# Patient Record
Sex: MALE
[De-identification: names, ages, dates, MRNs, and addresses within clinical notes are randomized per-mention and may not be internally consistent; named-entity substitution may affect disease eponyms.]

---

## 2021-04-12 ENCOUNTER — APPOINTMENT (OUTPATIENT)
Age: 46
End: 2021-04-12
Payer: COMMERCIAL

## 2021-04-12 PROCEDURE — 0001A: CPT

## 2021-05-03 ENCOUNTER — APPOINTMENT (OUTPATIENT)
Age: 46
End: 2021-05-03
Payer: COMMERCIAL

## 2021-05-03 PROCEDURE — 0002A: CPT

## 2022-04-20 ENCOUNTER — APPOINTMENT (OUTPATIENT)
Dept: ORTHOPEDIC SURGERY | Facility: CLINIC | Age: 47
End: 2022-04-20
Payer: COMMERCIAL

## 2022-04-20 VITALS — WEIGHT: 170 LBS | BODY MASS INDEX: 22.53 KG/M2 | HEIGHT: 73 IN

## 2022-04-20 DIAGNOSIS — Z87.891 PERSONAL HISTORY OF NICOTINE DEPENDENCE: ICD-10-CM

## 2022-04-20 DIAGNOSIS — Z78.9 OTHER SPECIFIED HEALTH STATUS: ICD-10-CM

## 2022-04-20 DIAGNOSIS — F12.90 CANNABIS USE, UNSPECIFIED, UNCOMPLICATED: ICD-10-CM

## 2022-04-20 PROBLEM — Z00.00 ENCOUNTER FOR PREVENTIVE HEALTH EXAMINATION: Status: ACTIVE | Noted: 2022-04-20

## 2022-04-20 PROCEDURE — 73562 X-RAY EXAM OF KNEE 3: CPT | Mod: RT

## 2022-04-20 PROCEDURE — 99203 OFFICE O/P NEW LOW 30 MIN: CPT

## 2022-04-20 NOTE — PHYSICAL EXAM
[Right] : right knee [NL (0)] : extension 0 degrees [5___] : hamstring 5[unfilled]/5 [AP] : anteroposterior [Lateral] : lateral [[place]] : skyline [] : non-antalgic [FreeTextEntry9] : mild djd of patellofemoral joint space, superior and inferior osteophytes/posterior patellar djd [TWNoteComboBox7] : flexion 125 degrees

## 2022-04-20 NOTE — HISTORY OF PRESENT ILLNESS
[7] : 7 [2] : 2 [Dull/Aching] : dull/aching [Sharp] : sharp [Intermittent] : intermittent [Rest] : rest [Stairs] : stairs [de-identified] : Patient presents today with right knee pain for 8 months with NKI. Patient is very active, running, biking, and skiing. States that he has pain after activities. Denies symptoms of instability, mechanical locking/clicking. Pain is lateral and under the kneecap. Denies previous treatments. Takes Advil PRN. Denies recent imaging. [] : no [FreeTextEntry1] : Right Knee [de-identified] : Running, Driving

## 2022-04-20 NOTE — ASSESSMENT
[FreeTextEntry1] : 47 yo male presenting with right knee IT band syndrome, mild patellofemoral oa. Denies mechanical symptoms\par -X-rays demonstrating posterior patellar djd with superior and inferior patellar osteophytes\par -Rx PT given today\par -Activities as tolerated\par -Rest, ice, NSAIDs PRN for pain\par -All questions answered\par -Fu 6 weeks

## 2022-06-01 ENCOUNTER — APPOINTMENT (OUTPATIENT)
Dept: ORTHOPEDIC SURGERY | Facility: CLINIC | Age: 47
End: 2022-06-01
Payer: COMMERCIAL

## 2022-06-01 ENCOUNTER — NON-APPOINTMENT (OUTPATIENT)
Age: 47
End: 2022-06-01

## 2022-06-01 DIAGNOSIS — M17.11 UNILATERAL PRIMARY OSTEOARTHRITIS, RIGHT KNEE: ICD-10-CM

## 2022-06-01 DIAGNOSIS — M76.31 ILIOTIBIAL BAND SYNDROME, RIGHT LEG: ICD-10-CM

## 2022-06-01 PROCEDURE — 99213 OFFICE O/P EST LOW 20 MIN: CPT

## 2022-06-01 NOTE — PHYSICAL EXAM
[Right] : right knee [NL (0)] : extension 0 degrees [5___] : hamstring 5[unfilled]/5 [AP] : anteroposterior [Lateral] : lateral [[place]] : skyline [] : non-antalgic [FreeTextEntry9] : mild djd of patellofemoral joint space, superior and inferior osteophytes/posterior patellar djd [TWNoteComboBox7] : flexion 125 degrees

## 2022-06-01 NOTE — REASON FOR VISIT
[FreeTextEntry2] : RT knee pain for 8 months with NKI. Patient is very active, running, biking, and skiing. States that he has pain after activities.

## 2022-06-01 NOTE — HISTORY OF PRESENT ILLNESS
[7] : 7 [2] : 2 [Dull/Aching] : dull/aching [Sharp] : sharp [Intermittent] : intermittent [Rest] : rest [Stairs] : stairs [de-identified] : 4/20/22 Patient presents today with right knee pain for 8 months with NKI. Patient is very active, running, biking, and skiing. States that he has pain after activities. Denies symptoms of instability, mechanical locking/clicking. Pain is lateral and under the kneecap. Denies previous treatments. Takes Advil PRN. Denies recent imaging.\par \par 6/1/22 feels 80% better. doing therapy, no meds for it. it clicks anterioly and is uncomfortable. [] : no [FreeTextEntry1] : Right Knee [de-identified] : Running, Driving

## 2022-06-01 NOTE — HISTORY OF PRESENT ILLNESS
[Result of repetitive motion] : result of repetitive motion [2] : 2 [Dull/Aching] : dull/aching [Sharp] : sharp [Intermittent] : intermittent [Rest] : rest [Stairs] : stairs [de-identified] : Pt is here following up on RT knee. He has been doing PT 2x a week and once a day at home. He does think it's helping, it is still making a clicking noise and now it only hurts when he runs a lot or lifts too heavy. Active is now 2/10 from 7/10 last visit. 80% better. No NSAIDs. Some clicking in knee cap with squatting motion. [] : no [FreeTextEntry1] : Right Knee [de-identified] : Running, Driving

## 2022-06-01 NOTE — PHYSICAL EXAM
[Right] : right knee [NL (0)] : extension 0 degrees [5___] : hamstring 5[unfilled]/5 [AP] : anteroposterior [Lateral] : lateral [[place]] : skyline [] : non-antalgic [FreeTextEntry9] : mild djd of patellofemoral joint space, superior and inferior osteophytes/posterior patellar djd [TWNoteComboBox7] : flexion 125 degrees

## 2024-04-29 ENCOUNTER — NON-APPOINTMENT (OUTPATIENT)
Age: 49
End: 2024-04-29

## 2024-05-14 ENCOUNTER — APPOINTMENT (OUTPATIENT)
Dept: NEUROLOGY | Facility: CLINIC | Age: 49
End: 2024-05-14
Payer: COMMERCIAL

## 2024-05-14 VITALS
SYSTOLIC BLOOD PRESSURE: 111 MMHG | HEIGHT: 73 IN | BODY MASS INDEX: 25.18 KG/M2 | HEART RATE: 59 BPM | DIASTOLIC BLOOD PRESSURE: 68 MMHG | WEIGHT: 190 LBS | OXYGEN SATURATION: 97 %

## 2024-05-14 DIAGNOSIS — R06.83 SNORING: ICD-10-CM

## 2024-05-14 DIAGNOSIS — G43.009 MIGRAINE W/OUT AURA, NOT INTRACTABLE, W/OUT STATUS MIGRAINOSUS: ICD-10-CM

## 2024-05-14 DIAGNOSIS — Z78.9 OTHER SPECIFIED HEALTH STATUS: ICD-10-CM

## 2024-05-14 DIAGNOSIS — Z87.820 PERSONAL HISTORY OF TRAUMATIC BRAIN INJURY: ICD-10-CM

## 2024-05-14 DIAGNOSIS — R40.0 SOMNOLENCE: ICD-10-CM

## 2024-05-14 DIAGNOSIS — R41.89 OTHER SYMPTOMS AND SIGNS INVOLVING COGNITIVE FUNCTIONS AND AWARENESS: ICD-10-CM

## 2024-05-14 PROCEDURE — 99204 OFFICE O/P NEW MOD 45 MIN: CPT

## 2024-05-14 NOTE — ASSESSMENT
[FreeTextEntry1] : 49 y/o man hx of multiple concussions, headaches, presenting with worsening brain fog and sense of discomfort in the head with some severe headaches. There are features of photo/phonophobia, nausea and vomiting, worse with activity which meets criteria for migraine. He also endorses daytime sleepiness and excessive snoring. Would rule out SANDER.    Labs: he will send most recent blood work from PCP Referral to sleep medicine Dr. Cavazos   Imaging not indicated at this time. If there is a change in headache features or patient's condition then will re-consider imaging.   Treatment: he will get back as to what medication was recently started and then will adjust.    Advised to keep headache journal to track headache frequency, triggers, and response to treatment.   Discussed common side effects of prescribed medications and potential alternatives.    Counseled patient on the importance of maintaining a healthy lifestyle, including balanced diet, adequate hydration, stress management, proper sleep hygiene, and physical activity.     Answered all questions and concerns to the best of my ability. Advised to call for any new or worsening symptoms.    In order to maintain continuity of care/prescription refills, patients must be seen on a yearly basis.   Total time spent on the day of the visit, including pre-visit and post-visit time was 50 minutes.    Follow up in 3 months

## 2024-05-14 NOTE — HISTORY OF PRESENT ILLNESS
[FreeTextEntry1] : CC: headaches   Patient Referred by: PCP    HPI: 48yoM complaining of headaches worsening since 4 months ago. Also with complaints of significant brain fog. Describes it as "cooked" "burnt". Cites stressful job as a stressor. Hx of multiple concussions, previously a semi-pro race . Also heartburn past 4 months, frequent tums usage. Patient reports that his headaches aren't that frequent, however when he has bad headaches he needs to go into a room and turn off lights. Reports gaining 20lbs in 1 year due to knee injury. Endorses fatigue during the day, snores heavily at night.   Hx: none FMHx: none Medications: Takes two medications, unknown.    HAs began: Location: starts in the front, travels throughout top of the head Quality: unable to elaborate, more like discomfort, "burning"  Severity: 8-9/10 only a couple times, sometimes 5-6/10 Disability: able to function MIDAS 28 severe Duration: brain fog lasts for hours, headaches when bad up to 3 hours Frequency: 24/30  Temporal course:  Time of day: inconsistent Pain Free between Attacks: couple days Triggers: Stress and work Relieving factors: going into a dark room Exacerbating factors: exercise and Valsalva maneuver do not make headache worse Prodrome or postdrome:   Aura: blotch in his peripheral vision. Sometimes present without accompanying headache.    Ass'd Symptoms: Nausea +  Vomiting + Photophobia + Phonophobia: + Osmophobia - brain fog/difficulty concentrating + Irritability + Allodynia - Blurred Vision - Neck pain - Dizziness -   Autonomic features: Eye tearing- Nose running- Eye redness- Foreign body sensation-   Additional Social/Work History: Occupation: Salesman Habits: Caffeine: 1.5-3 big cups a day ETOH: 3-4beers in a week. Varies       Tobacco:  vape     Drugs: none in 10+ years Exercise: Not good Diet: good, eats breakfast typically hydration: good Ophthalmologic: Sleep: 7.5-8hr Dental: none Sinus/Allergies: none Head Trauma: 4 concussions, most recent 8-9y ago. was unconscious. Hypermobility: Psychiatric: none   Treatment present: Acute: advil, excedrin  Preventive: unknown    Prior medications: none   Non-pharmacologic Tx: ice helmet   Imaging: MRI brain non con 4/25/2024:  Normal brain MRI. Small fluid signal right mastoid, presumably reactive. * No complete opacification of the left maxillary sinus presumably on the basis of retention cyst.   CT maxillary sinus noncon 5/09/2024: Mild mucosal thickening of the paranasal sinuses, presumably inflammatory in nature, with patent sinus drainage pathways throughout.  * Right nasal septal deviation with associated spur that contacts the ipsilateral inferior turbinate. This would be considered a finding of no clinical significance absent contact point headache/Sluder neuralgia.   Labs:  Full panel with PCP.   Childhood precursors: Somnambulism - motion sickness -  cyclical vomiting

## 2024-05-14 NOTE — PHYSICAL EXAM
[General Appearance - Alert] : alert [General Appearance - In No Acute Distress] : in no acute distress [Oriented To Time, Place, And Person] : oriented to person, place, and time [Impaired Insight] : insight and judgment were intact [Affect] : the affect was normal [Person] : oriented to person [Place] : oriented to place [Time] : oriented to time [Concentration Intact] : normal concentrating ability [Visual Intact] : visual attention was ~T not ~L decreased [Naming Objects] : no difficulty naming common objects [Repeating Phrases] : no difficulty repeating a phrase [Writing A Sentence] : no difficulty writing a sentence [Fluency] : fluency intact [Comprehension] : comprehension intact [Reading] : reading intact [Past History] : adequate knowledge of personal past history [Cranial Nerves Optic (II)] : visual acuity intact bilaterally,  visual fields full to confrontation, pupils equal round and reactive to light [Cranial Nerves Oculomotor (III)] : extraocular motion intact [Cranial Nerves Trigeminal (V)] : facial sensation intact symmetrically [Cranial Nerves Facial (VII)] : face symmetrical [Cranial Nerves Vestibulocochlear (VIII)] : hearing was intact bilaterally [Cranial Nerves Glossopharyngeal (IX)] : tongue and palate midline [Cranial Nerves Accessory (XI - Cranial And Spinal)] : head turning and shoulder shrug symmetric [Cranial Nerves Hypoglossal (XII)] : there was no tongue deviation with protrusion [Motor Strength] : muscle strength was normal in all four extremities [No Muscle Atrophy] : normal bulk in all four extremities [Sensation Tactile Decrease] : light touch was intact [Balance] : balance was intact [1+] : Ankle jerk left 1+ [Sclera] : the sclera and conjunctiva were normal [PERRL With Normal Accommodation] : pupils were equal in size, round, reactive to light, with normal accommodation [Extraocular Movements] : extraocular movements were intact [Full Visual Field] : full visual field [Outer Ear] : the ears and nose were normal in appearance [Hearing Threshold Finger Rub Not [place]] : hearing was normal [Neck Appearance] : the appearance of the neck was normal [] : no respiratory distress [Respiration, Rhythm And Depth] : normal respiratory rhythm and effort [Abnormal Walk] : normal gait [Nail Clubbing] : no clubbing  or cyanosis of the fingernails [Musculoskeletal - Swelling] : no joint swelling seen [Motor Tone] : muscle strength and tone were normal [Past-pointing] : there was no past-pointing [Tremor] : no tremor present [Plantar Reflex Right Only] : normal on the right [Plantar Reflex Left Only] : normal on the left

## 2024-08-19 ENCOUNTER — APPOINTMENT (OUTPATIENT)
Dept: NEUROLOGY | Facility: CLINIC | Age: 49
End: 2024-08-19
Payer: COMMERCIAL

## 2024-08-19 VITALS
WEIGHT: 190 LBS | SYSTOLIC BLOOD PRESSURE: 107 MMHG | OXYGEN SATURATION: 97 % | HEART RATE: 66 BPM | BODY MASS INDEX: 25.18 KG/M2 | DIASTOLIC BLOOD PRESSURE: 68 MMHG | HEIGHT: 73 IN

## 2024-08-19 DIAGNOSIS — G43.009 MIGRAINE W/OUT AURA, NOT INTRACTABLE, W/OUT STATUS MIGRAINOSUS: ICD-10-CM

## 2024-08-19 DIAGNOSIS — R41.89 OTHER SYMPTOMS AND SIGNS INVOLVING COGNITIVE FUNCTIONS AND AWARENESS: ICD-10-CM

## 2024-08-19 PROCEDURE — 99213 OFFICE O/P EST LOW 20 MIN: CPT

## 2024-08-19 NOTE — ASSESSMENT
[FreeTextEntry1] : 47 y/o man hx of multiple concussions, brain fog and migraine without aura. He also endorses daytime sleepiness and excessive snoring. Would rule out SANDER.  His symptoms have gotten significantly better since last visit with lifestyle changes.     Referral to sleep medicine Dr. Cavazos   Imaging not indicated at this time. If there is a change in headache features or patient's condition then will re-consider imaging.   Treatment: Tylenol or Motrin 400-600mg as needed. Continue with lifestyle changes.    Advised to keep headache journal to track headache frequency, triggers, and response to treatment.   Discussed common side effects of prescribed medications and potential alternatives.    Counseled patient on the importance of maintaining a healthy lifestyle, including balanced diet, adequate hydration, stress management, proper sleep hygiene, and physical activity.     Answered all questions and concerns to the best of my ability. Advised to call for any new or worsening symptoms.    In order to maintain continuity of care/prescription refills, patients must be seen on a yearly basis.   Total time spent on the day of the visit, including pre-visit and post-visit time was 25 minutes.    Follow up in 6 months

## 2024-08-19 NOTE — PHYSICAL EXAM
[General Appearance - Alert] : alert [General Appearance - In No Acute Distress] : in no acute distress [Oriented To Time, Place, And Person] : oriented to person, place, and time [Impaired Insight] : insight and judgment were intact [Affect] : the affect was normal [Person] : oriented to person [Place] : oriented to place [Time] : oriented to time [Concentration Intact] : normal concentrating ability [Visual Intact] : visual attention was ~T not ~L decreased [Naming Objects] : no difficulty naming common objects [Repeating Phrases] : no difficulty repeating a phrase [Writing A Sentence] : no difficulty writing a sentence [Fluency] : fluency intact [Comprehension] : comprehension intact [Reading] : reading intact [Past History] : adequate knowledge of personal past history [Cranial Nerves Optic (II)] : visual acuity intact bilaterally,  visual fields full to confrontation, pupils equal round and reactive to light [Cranial Nerves Oculomotor (III)] : extraocular motion intact [Cranial Nerves Trigeminal (V)] : facial sensation intact symmetrically [Cranial Nerves Facial (VII)] : face symmetrical [Cranial Nerves Vestibulocochlear (VIII)] : hearing was intact bilaterally [Cranial Nerves Glossopharyngeal (IX)] : tongue and palate midline [Cranial Nerves Accessory (XI - Cranial And Spinal)] : head turning and shoulder shrug symmetric [Cranial Nerves Hypoglossal (XII)] : there was no tongue deviation with protrusion [Motor Strength] : muscle strength was normal in all four extremities [No Muscle Atrophy] : normal bulk in all four extremities [Sensation Tactile Decrease] : light touch was intact [Balance] : balance was intact [1+] : Ankle jerk left 1+ [Sclera] : the sclera and conjunctiva were normal [PERRL With Normal Accommodation] : pupils were equal in size, round, reactive to light, with normal accommodation [Extraocular Movements] : extraocular movements were intact [Full Visual Field] : full visual field [Outer Ear] : the ears and nose were normal in appearance [Hearing Threshold Finger Rub Not [place]] : hearing was normal [Neck Appearance] : the appearance of the neck was normal [] : no respiratory distress [Respiration, Rhythm And Depth] : normal respiratory rhythm and effort [Abnormal Walk] : normal gait [Nail Clubbing] : no clubbing  or cyanosis of the fingernails [Musculoskeletal - Swelling] : no joint swelling seen [Motor Tone] : muscle strength and tone were normal [Past-pointing] : there was no past-pointing [Tremor] : no tremor present [Plantar Reflex Right Only] : normal on the right [Plantar Reflex Left Only] : normal on the left

## 2024-08-19 NOTE — HISTORY OF PRESENT ILLNESS
[FreeTextEntry1] : 47 y/o man last seen May 14 for brain fog and migraine headaches. Here for follow up. Since last visit: He got new prescription glasses. He has cut down on caffeine. He is exercising twice a week. Cut down nicotine, using the patches. His sleep has been more consistent. He has cut down on soda and drinking more water. He hasn't had a bad headache since his last visit. He had some stressors which have been getting better such as his father with cancer, going through a divorce. The brain fog has gotten a lot a better. He started the medication he was given, but ran out in 30 days and hasn't refilled the script. He has yet to see the sleep specialist for his snoring.    HPI: 48yoM complaining of headaches worsening since 4 months ago. Also with complaints of significant brain fog. Describes it as "cooked" "burnt". Cites stressful job as a stressor. Hx of multiple concussions, previously a semi-pro race . Also heartburn past 4 months, frequent tums usage. Patient reports that his headaches aren't that frequent, however when he has bad headaches he needs to go into a room and turn off lights. Reports gaining 20lbs in 1 year due to knee injury. Endorses fatigue during the day, snores heavily at night.   Hx: none FMHx: none Medications: Takes two medications, unknown.    HAs began: Location: starts in the front, travels throughout top of the head Quality: unable to elaborate, more like discomfort, "burning"  Severity: 8-9/10 only a couple times, sometimes 5-6/10 Disability: able to function MIDAS 28 severe Duration: brain fog lasts for hours, headaches when bad up to 3 hours Frequency: 24/30  Temporal course:  Time of day: inconsistent Pain Free between Attacks: couple days Triggers: Stress and work Relieving factors: going into a dark room Exacerbating factors: exercise and Valsalva maneuver do not make headache worse Prodrome or postdrome:   Aura: blotch in his peripheral vision. Sometimes present without accompanying headache.    Ass'd Symptoms: Nausea +  Vomiting + Photophobia + Phonophobia: + Osmophobia - brain fog/difficulty concentrating + Irritability + Allodynia - Blurred Vision - Neck pain - Dizziness -   Autonomic features: Eye tearing- Nose running- Eye redness- Foreign body sensation-   Additional Social/Work History: Occupation: Salesman Habits: Caffeine: 1.5-3 big cups a day ETOH: 3-4beers in a week. Varies       Tobacco:  vape     Drugs: none in 10+ years Exercise: Not good Diet: good, eats breakfast typically hydration: good Ophthalmologic: Sleep: 7.5-8hr Dental: none Sinus/Allergies: none Head Trauma: 4 concussions, most recent 8-9y ago. was unconscious. Hypermobility: Psychiatric: none   Treatment present: Acute: advil, excedrin  Preventive: unknown    Prior medications: none   Non-pharmacologic Tx: ice helmet   Imaging: MRI brain non con 4/25/2024:  Normal brain MRI. Small fluid signal right mastoid, presumably reactive. * No complete opacification of the left maxillary sinus presumably on the basis of retention cyst.   CT maxillary sinus noncon 5/09/2024: Mild mucosal thickening of the paranasal sinuses, presumably inflammatory in nature, with patent sinus drainage pathways throughout.  * Right nasal septal deviation with associated spur that contacts the ipsilateral inferior turbinate. This would be considered a finding of no clinical significance absent contact point headache/Sluder neuralgia.   Labs:  Full panel with PCP.   Childhood precursors: Somnambulism - motion sickness -  cyclical vomiting

## 2025-02-18 ENCOUNTER — APPOINTMENT (OUTPATIENT)
Dept: NEUROLOGY | Facility: CLINIC | Age: 50
End: 2025-02-18